# Patient Record
Sex: MALE | Race: WHITE | NOT HISPANIC OR LATINO | Employment: OTHER
[De-identification: names, ages, dates, MRNs, and addresses within clinical notes are randomized per-mention and may not be internally consistent; named-entity substitution may affect disease eponyms.]

---

## 2024-02-27 ENCOUNTER — TRANSCRIBE ORDERS (OUTPATIENT)
Dept: OTHER | Age: 59
End: 2024-02-27

## 2024-02-27 ENCOUNTER — TELEPHONE (OUTPATIENT)
Dept: NEUROLOGY | Facility: CLINIC | Age: 59
End: 2024-02-27

## 2024-02-27 DIAGNOSIS — R13.10 DYSPHAGIA, UNSPECIFIED: ICD-10-CM

## 2024-02-27 DIAGNOSIS — R47.1 DYSARTHRIA: ICD-10-CM

## 2024-02-27 DIAGNOSIS — R27.0 ATAXIA: Primary | ICD-10-CM

## 2024-02-27 DIAGNOSIS — R29.6 REPEATED FALLS: ICD-10-CM

## 2024-02-27 NOTE — TELEPHONE ENCOUNTER
Per note from PCP:  After reviewing last Neuro note with Dr. Dominguez, he recommends patient to see a Neuro Muscular Specialist at [facility] or [place]. They are more specialized with his symptoms. Will scan all notes into Epic.     Chart review shows pt has hx of ataxia and tremor.     Per referral comment:  Reason for Referral - Consult (Routine) - Authorized  Comments   [facility]   Dr Jarad Campbell or Dr Miky Escalona       Will route to [facility] to assist with scheduling with movement disorders.     Natalie PALMER RN, BSN  [facility] Neurology

## 2024-02-27 NOTE — TELEPHONE ENCOUNTER
Health Call Center    Phone Message    May a detailed message be left on voicemail: yes     Reason for Call: Appointment Intake    Referring Provider Name: Jeannette Childers MD  Diagnosis and/or Symptoms: Ataxia [R27.0]  Repeated falls [R29.6]  Dysphagia, unspecified [R13.10]  Dysarthria [R47.1]    Please review and assist with scheduling, patient has been referred to Movement clinic, diagnosis conflicting with protocol.    Action Taken: Other: Neurology    Travel Screening: Not Applicable

## 2024-03-01 NOTE — TELEPHONE ENCOUNTER
Called pt and arranged for him to be seen in the ataxia clinic with Dr. Dave Haro and Casey Smith, genetic counselor on March 29 at 10:30am. He is referred by Dr. Jeannette Childers from [facility]. He said they recently moved back to [place] from [place]. He has been seen by Dr. Phill South at [facility] in [place] when he has had CT's and MRI's. He said he always had problems with his legs but a while ago he had a fusion of his spine in his neck and about 1 1/2 years after that surgery he started having legs, balance and weakness. 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 He doesn't sleep well because he has a lot of pain all over his body. He hasn't fallen lately.Sometimes he uses a cane or else hangs on his wife for stability. He chokes at times on food, mostly pasta. He had an MRI in 2016 or 2017 in [place] of his head but had scans of his back in 2021 at the same clinic. He doesn't have problems with bowel or bladder habits.

## 2024-03-19 NOTE — TELEPHONE ENCOUNTER
Action 3/19/24 MV 11.27am   Action Taken 1) Called [facility] to obtain care everywhere ID. Was told to send me request to the email provided in care everywhere or fax request for records. Phone number to imaging department: [phone]  2) Email sent to [email or web address]      Action 3/19/24 MV 1.55pm   Action Taken Received email from [facility] with patient's CE ID - records pulled in CE     Action 3/21/24 MV 11.44am   Action Taken Imaging request faxed to [facility]  Fax # [phone]  Tracking # [id number]     Action 3/25/24 MV 8.49am   Action Taken 2nd request faxed to [facility]    UPDATE: [facility] returned my fax stating they already sent off the disks with my label     Action 3/28/24 MV 1.53pm   Action Taken Imaging disks delivered Wednesday, however not in mail that was delivered yesterday nor today. Went to  and [facility] not there. Went to dock and they said everything was taken by Latoya already, tomas if it was delivered yesterday. IB sent to [facility]     Action 4/11/24 MV 12.09pm   Action Taken Sent new [employer] label:  [id number]       RECORDS RECEIVED FROM: external   REASON FOR VISIT: ataxia   PROVIDER: Dr. Haro   DATE OF APPT: 3/29/24   NOTES (FOR ALL VISITS) STATUS DETAILS   OFFICE NOTE from referring provider Care Everywhere Dr Jeannette Childers @ [facility]:  1/26/24   OFFICE NOTE from other specialist Care Everywhere Dr. Phill South @ [facility] [place]:  1/10/22 telephone encounter  11/26/18  10/29/15  2/7/11  (Additional encounters)    Katja Bragg @ [facility] Genetic Testing:  10/29/15  9/14/15  9/10/15  7/24/15  6/23/15   EMG Care Everywhere [facility]:  7/7/21  7/1/15  4/17/09   MEDICATION LIST Care Everywhere    IMAGING  (FOR ALL VISITS)     MRI (HEAD, NECK, SPINE) In process [facility]:  MRI Thoracic Spine 5/22/21  MRI Lumbar Spine 5/22/21  MRI Cervical Spine 4/3/21  MRI Lumbar Spine 7/11/19  MRI  Cervical Spine 6/1/19  MRI Brain 12/15/18  MRI Cervical Spine 5/20/17  MRI Thoracic Spine 4/29/17  MRI Lumbar Spine 2/25/17  MRI Cervical Spine 6/18/16  MRI Lumbar Spine 8/24/13  MRI Cervical Spine 3/23/13  MRI Cervical Spine 3/27/10  MRI Lumbar Spine 11/21/09  MRI Cervical Spine 1/17/09  MRI Cervical Spine 5/19/07  MRI Cervical Spine 8/2/03

## 2024-03-29 ENCOUNTER — OFFICE VISIT (OUTPATIENT)
Dept: NEUROLOGY | Facility: CLINIC | Age: 59
End: 2024-03-29
Attending: STUDENT IN AN ORGANIZED HEALTH CARE EDUCATION/TRAINING PROGRAM
Payer: COMMERCIAL

## 2024-03-29 ENCOUNTER — PRE VISIT (OUTPATIENT)
Dept: NEUROLOGY | Facility: CLINIC | Age: 59
End: 2024-03-29

## 2024-03-29 VITALS
WEIGHT: 191.3 LBS | HEART RATE: 87 BPM | SYSTOLIC BLOOD PRESSURE: 126 MMHG | DIASTOLIC BLOOD PRESSURE: 88 MMHG | OXYGEN SATURATION: 98 %

## 2024-03-29 DIAGNOSIS — M54.12 CERVICAL RADICULOPATHY: Primary | ICD-10-CM

## 2024-03-29 DIAGNOSIS — R29.6 REPEATED FALLS: ICD-10-CM

## 2024-03-29 DIAGNOSIS — R13.10 DYSPHAGIA, UNSPECIFIED: ICD-10-CM

## 2024-03-29 DIAGNOSIS — R47.1 DYSARTHRIA: ICD-10-CM

## 2024-03-29 DIAGNOSIS — R27.0 ATAXIA: Primary | ICD-10-CM

## 2024-03-29 PROCEDURE — 99207 PR NO CHARGE LOS: CPT | Performed by: GENETIC COUNSELOR, MS

## 2024-03-29 PROCEDURE — 99202 OFFICE O/P NEW SF 15 MIN: CPT | Performed by: PSYCHIATRY & NEUROLOGY

## 2024-03-29 RX ORDER — MELOXICAM 7.5 MG/1
7.5 TABLET ORAL
COMMUNITY
Start: 2024-01-26

## 2024-03-29 RX ORDER — PROPRANOLOL HCL 60 MG
60 CAPSULE, EXTENDED RELEASE 24HR ORAL
COMMUNITY
Start: 2024-01-26

## 2024-03-29 RX ORDER — CYCLOBENZAPRINE HCL 10 MG
10 TABLET ORAL
COMMUNITY
Start: 2024-01-26

## 2024-03-29 RX ORDER — HYDROCHLOROTHIAZIDE 25 MG/1
1 TABLET ORAL EVERY MORNING
COMMUNITY
Start: 2024-01-26

## 2024-03-29 RX ORDER — ATORVASTATIN CALCIUM 40 MG/1
40 TABLET, FILM COATED ORAL DAILY
COMMUNITY
Start: 2024-01-26

## 2024-03-29 ASSESSMENT — PAIN SCALES - GENERAL: PAINLEVEL: MODERATE PAIN (5)

## 2024-03-29 NOTE — LETTER
3/29/2024       RE: Jony Olson  [address]  [place] [address]     Dear Colleague,    Thank you for referring your patient, Jony Olson, to the [facility] NEUROLOGY CLINIC [place] [facility]. Please see a copy of my visit note below.    Mr. Pineda is a 58-year-old man who moved to [place] from [place] about 2 years ago.  He was thoroughly investigated in [place] at [facility] regarding weakness of the upper and lower extremities.  He did have cervical discectomy anterior cervical discectomy and fusion a while ago.  This did apparently help lower extremity weakness but the symptoms recurred.  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  It is unclear why he was referred to the ataxia clinic rather than neuromuscular clinic.  He does have tremor which is responsive to propranolol.  His father also had tremor so this is likely essential tremor and related to the his other neurological condition.  No past medical history on file.  We do not have brain MRI imaging available    Current Outpatient Medications   Medication    atorvastatin (LIPITOR) 40 MG tablet    cyclobenzaprine (FLEXERIL) 10 MG tablet    hydrochlorothiazide (HYDRODIURIL) 25 MG tablet    meloxicam (MOBIC) 7.5 MG tablet    propranolol ER (INDERAL LA) 60 MG 24 hr capsule     No current facility-administered medications for this visit.     Physical examination does have long muscular neck and profound sloping of the shoulders strength was otherwise 5 to 5-/5 in the upper and lower extremities reflexes were symmetrical slightly hyper at the knees.  Plantars were equivocal bilaterally.  Pinprick sensation was unremarkable vibratory sensation was unremarkable he could walk on tiptoes and heels his gait was  wide-based but could perform tandem with a slight difficulty.    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  I will refer him to neuromuscular clinic for further evaluation.  He was seen by our genetic counselor Casey Smith please see Casey's note for more details.      Again, thank you for allowing me to participate in the care of your patient.      Sincerely,    Joni Haro MD

## 2024-03-29 NOTE — PROGRESS NOTES
Mr. Pineda is a 58-year-old man who moved to [place] from [place] about 2 years ago.  He was thoroughly investigated in [place] at [facility] regarding weakness of the upper and lower extremities.  He did have cervical discectomy anterior cervical discectomy and fusion a while ago.  This did apparently help lower extremity weakness but the symptoms recurred.  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  It is unclear why he was referred to the ataxia clinic rather than neuromuscular clinic.  He does have tremor which is responsive to propranolol.  His father also had tremor so this is likely essential tremor and related to the his other neurological condition.  No past medical history on file.  We do not have brain MRI imaging available    Current Outpatient Medications   Medication    atorvastatin (LIPITOR) 40 MG tablet    cyclobenzaprine (FLEXERIL) 10 MG tablet    hydrochlorothiazide (HYDRODIURIL) 25 MG tablet    meloxicam (MOBIC) 7.5 MG tablet    propranolol ER (INDERAL LA) 60 MG 24 hr capsule     No current facility-administered medications for this visit.     Physical examination does have long muscular neck and profound sloping of the shoulders strength was otherwise 5 to 5-/5 in the upper and lower extremities reflexes were symmetrical slightly hyper at the knees.  Plantars were equivocal bilaterally.  Pinprick sensation was unremarkable vibratory sensation was unremarkable he could walk on tiptoes and heels his gait was wide-based but could perform tandem with a slight difficulty.    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  I will refer him to neuromuscular clinic for further evaluation.  He was seen by our genetic counselor  Casey Smith please see Casey's note for more details.

## 2024-03-29 NOTE — LETTER
3/29/2024       RE: Jony Olson  [address]  [place] [address]     Dear Colleague,    Thank you for referring your patient, Jony Olson, to the [facility] NEUROLOGY CLINIC [facility]. Please see a copy of my visit note below.    GENETIC COUNSELING-Neurology  Jony Olson was seen today for genetic consultation and neurologic exam. Jony was accompanied to the appointment by his wife, Isabelle. I spent a total of 20 minutes with the patient with the majority of the time being spent on genetic counseling and testing options. The patient also underwent a neurological examination from Dr. Haro after our visit.      MEDICAL HISTORY  Please see Dr. Haro's notes for a more thorough summary of medical history. Briefly, Jony reports that he developed symptoms of a neuromuscular disease around 2014. He was noted to have scapular winging.  He had genetic testing for FSHD (negative). He also had an LGMD panel that identified two pathogenic variants- but each variant was a single variant in a recessive gene without a second variant.  This is documented in care everywhere and these variants were not believed to be the cause of his symptoms.      He was seen in ataxia clinic due to a note that he had ataxia on a prior exam- but in reviewing old clinic notes, all I can see is a statement that he has unsteady gait WITHOUT features of ataxia.     FAMILY HISTORY-see scanned pedigree  Jony's father was reported to have balance issues with onset in his 70s'.  Jony's mother and maternal grandmother both were diagnosed with dementia.        GENETIC COUNSELING  We discussed the genetic and environmental basis of neurologic disease. I explained that in most cases, it results from complex and lifelong interaction of multiple genetic and environmental factors. In some cases, there may be a single gene cause. I explained that the presence  of additional family members and/or young ages at diagnosis are typically clues for a single-gene cause.  Given that it was not clear that he was actually supposed to be seen in an ataxia clinic, we did not pursue any additional genetic testing at today's visit.        Again, thank you for allowing me to participate in the care of your patient.      Sincerely,    Roshan Smith GC

## 2024-03-29 NOTE — PROGRESS NOTES
GENETIC COUNSELING-Neurology  Jony Olson was seen today for genetic consultation and neurologic exam. Jony was accompanied to the appointment by his wife, Isabelle. I spent a total of 20 minutes with the patient with the majority of the time being spent on genetic counseling and testing options. The patient also underwent a neurological examination from Dr. Haro after our visit.      MEDICAL HISTORY  Please see Dr. Haro's notes for a more thorough summary of medical history. Briefly, Jony reports that he developed symptoms of a neuromuscular disease around 2014. He was noted to have scapular winging.  He had genetic testing for FSHD (negative). He also had an LGMD panel that identified two pathogenic variants- but each variant was a single variant in a recessive gene without a second variant.  This is documented in care everywhere and these variants were not believed to be the cause of his symptoms.      He was seen in ataxia clinic due to a note that he had ataxia on a prior exam- but in reviewing old clinic notes, all I can see is a statement that he has unsteady gait WITHOUT features of ataxia.     FAMILY HISTORY-see scanned pedigree  Jony's father was reported to have balance issues with onset in his 70s'.  Jony's mother and maternal grandmother both were diagnosed with dementia.        GENETIC COUNSELING  We discussed the genetic and environmental basis of neurologic disease. I explained that in most cases, it results from complex and lifelong interaction of multiple genetic and environmental factors. In some cases, there may be a single gene cause. I explained that the presence of additional family members and/or young ages at diagnosis are typically clues for a single-gene cause.  Given that it was not clear that he was actually supposed to be seen in an ataxia clinic, we did not pursue any additional genetic testing at today's visit.      Casey Smith MS, [facility]  Certified Genetic  Counselor  [facility] and Ataxia Clinics

## 2024-08-06 ENCOUNTER — TRANSCRIBE ORDERS (OUTPATIENT)
Dept: OTHER | Age: 59
End: 2024-08-06

## 2024-08-06 DIAGNOSIS — R29.6 REPEATED FALLS: ICD-10-CM

## 2024-08-06 DIAGNOSIS — R47.1 DYSARTHRIA: ICD-10-CM

## 2024-08-06 DIAGNOSIS — R13.10 DYSPHAGIA, UNSPECIFIED TYPE: ICD-10-CM

## 2024-08-06 DIAGNOSIS — R27.0 ATAXIA: Primary | ICD-10-CM

## 2024-10-06 ENCOUNTER — HEALTH MAINTENANCE LETTER (OUTPATIENT)
Age: 59
End: 2024-10-06

## 2024-11-20 NOTE — TELEPHONE ENCOUNTER
RECORDS RECEIVED FROM: external   REASON FOR VISIT:   Ataxia   R29.6 (ICD-10-CM) - Repeated falls   R13.10 (ICD-10-CM) - Dysphagia, unspecified type   R47.1 (ICD-10-CM) - Dysarthria      PROVIDER: Dr. Rose   DATE OF APPT: 1/3/25   NOTES (FOR ALL VISITS) STATUS DETAILS   OFFICE NOTE from referring provider Care Everywhere Dr Jeannette Childers @ [facility]:  7/26/24   OFFICE NOTE from other specialist Internal/CE Dr Haro @ [facility] Neuro:  3/29/24    Dr. Phill South @ [facility] [place]:  1/10/22 telephone encounter  11/26/18  10/29/15  2/7/11  (Additional encounters)     Katja Bragg @ [facility] Genetic Testing:  10/29/15  9/14/15  9/10/15  7/24/15  6/23/15   EMG Care Everywhere [facility]:  7/7/21  7/1/15  4/17/09   MEDICATION LIST Internal    IMAGING  (FOR ALL VISITS)     MRI (HEAD, NECK, SPINE) PACS [facility]:  MRI Thoracic Spine 5/22/21  MRI Lumbar Spine 5/22/21  MRI Cervical Spine 4/3/21  MRI Lumbar Spine 7/11/19  MRI Cervical Spine 6/1/19  MRI Brain 12/15/18  MRI Cervical Spine 5/20/17  MRI Thoracic Spine 4/29/17  MRI Lumbar Spine 2/25/17  MRI Cervical Spine 6/18/16  MRI Lumbar Spine 8/24/13  MRI Cervical Spine 3/23/13  MRI Cervical Spine 3/27/10  MRI Lumbar Spine 11/21/09  MRI Cervical Spine 1/17/09  MRI Cervical Spine 5/19/07  MRI Cervical Spine 8/2/03

## 2025-01-03 ENCOUNTER — ANCILLARY PROCEDURE (OUTPATIENT)
Dept: GENERAL RADIOLOGY | Facility: CLINIC | Age: 60
End: 2025-01-03
Attending: STUDENT IN AN ORGANIZED HEALTH CARE EDUCATION/TRAINING PROGRAM
Payer: COMMERCIAL

## 2025-01-03 ENCOUNTER — PRE VISIT (OUTPATIENT)
Dept: NEUROLOGY | Facility: CLINIC | Age: 60
End: 2025-01-03

## 2025-01-03 ENCOUNTER — LAB (OUTPATIENT)
Dept: LAB | Facility: CLINIC | Age: 60
End: 2025-01-03
Payer: COMMERCIAL

## 2025-01-03 DIAGNOSIS — M47.812 CERVICAL SPONDYLOSIS WITHOUT MYELOPATHY: ICD-10-CM

## 2025-01-03 DIAGNOSIS — R13.10 DYSPHAGIA, UNSPECIFIED TYPE: ICD-10-CM

## 2025-01-03 DIAGNOSIS — M62.58 MUSCLE ATROPHY OF UPPER EXTREMITY: ICD-10-CM

## 2025-01-03 LAB
CK SERPL-CCNC: 231 U/L (ref 39–308)
LACTATE SERPL-SCNC: 0.7 MMOL/L (ref 0.7–2)
Lab: NORMAL
PERFORMING LABORATORY: NORMAL
SPECIMEN STATUS: NORMAL
TEST NAME: NORMAL
TOTAL PROTEIN SERUM FOR ELP: 7.1 G/DL (ref 6.4–8.3)
TSH SERPL DL<=0.005 MIU/L-ACNC: 1.54 UIU/ML (ref 0.3–4.2)
VIT B12 SERPL-MCNC: 636 PG/ML (ref 232–1245)

## 2025-01-03 PROCEDURE — 86334 IMMUNOFIX E-PHORESIS SERUM: CPT | Performed by: STUDENT IN AN ORGANIZED HEALTH CARE EDUCATION/TRAINING PROGRAM

## 2025-01-03 PROCEDURE — 36415 COLL VENOUS BLD VENIPUNCTURE: CPT | Performed by: PATHOLOGY

## 2025-01-03 PROCEDURE — 36416 COLLJ CAPILLARY BLOOD SPEC: CPT | Performed by: PATHOLOGY

## 2025-01-03 PROCEDURE — 84443 ASSAY THYROID STIM HORMONE: CPT | Performed by: PATHOLOGY

## 2025-01-03 PROCEDURE — 86038 ANTINUCLEAR ANTIBODIES: CPT | Performed by: STUDENT IN AN ORGANIZED HEALTH CARE EDUCATION/TRAINING PROGRAM

## 2025-01-03 PROCEDURE — 99000 SPECIMEN HANDLING OFFICE-LAB: CPT | Performed by: PATHOLOGY

## 2025-01-03 PROCEDURE — 73522 X-RAY EXAM HIPS BI 3-4 VIEWS: CPT | Mod: GC | Performed by: RADIOLOGY

## 2025-01-03 PROCEDURE — 84155 ASSAY OF PROTEIN SERUM: CPT | Performed by: PATHOLOGY

## 2025-01-03 PROCEDURE — 82390 ASSAY OF CERULOPLASMIN: CPT | Performed by: STUDENT IN AN ORGANIZED HEALTH CARE EDUCATION/TRAINING PROGRAM

## 2025-01-03 PROCEDURE — 82550 ASSAY OF CK (CPK): CPT | Performed by: PATHOLOGY

## 2025-01-03 PROCEDURE — 82607 VITAMIN B-12: CPT | Performed by: STUDENT IN AN ORGANIZED HEALTH CARE EDUCATION/TRAINING PROGRAM

## 2025-01-03 PROCEDURE — 83605 ASSAY OF LACTIC ACID: CPT | Performed by: PATHOLOGY

## 2025-01-03 PROCEDURE — 84630 ASSAY OF ZINC: CPT | Mod: 90 | Performed by: PATHOLOGY

## 2025-01-03 PROCEDURE — 84165 PROTEIN E-PHORESIS SERUM: CPT | Mod: TC | Performed by: STUDENT IN AN ORGANIZED HEALTH CARE EDUCATION/TRAINING PROGRAM

## 2025-01-03 PROCEDURE — 86334 IMMUNOFIX E-PHORESIS SERUM: CPT | Mod: 26 | Performed by: STUDENT IN AN ORGANIZED HEALTH CARE EDUCATION/TRAINING PROGRAM

## 2025-01-03 PROCEDURE — 83789 MASS SPECTROMETRY QUAL/QUAN: CPT | Mod: 90 | Performed by: PATHOLOGY

## 2025-01-03 PROCEDURE — 84165 PROTEIN E-PHORESIS SERUM: CPT | Mod: 26 | Performed by: STUDENT IN AN ORGANIZED HEALTH CARE EDUCATION/TRAINING PROGRAM

## 2025-01-03 PROCEDURE — 82525 ASSAY OF COPPER: CPT | Mod: 90 | Performed by: PATHOLOGY

## 2025-01-05 LAB
COPPER SERPL-MCNC: 88.1 UG/DL
ZINC SERPL-MCNC: 61.6 UG/DL

## 2025-01-06 LAB
ALBUMIN SERPL ELPH-MCNC: 4.6 G/DL (ref 3.7–5.1)
ALPHA1 GLOB SERPL ELPH-MCNC: 0.3 G/DL (ref 0.2–0.4)
ALPHA2 GLOB SERPL ELPH-MCNC: 0.6 G/DL (ref 0.5–0.9)
ANA SER QL IF: NEGATIVE
B-GLOBULIN SERPL ELPH-MCNC: 0.7 G/DL (ref 0.6–1)
CERULOPLASMIN SERPL-MCNC: 28 MG/DL (ref 20–60)
GAMMA GLOB SERPL ELPH-MCNC: 0.9 G/DL (ref 0.7–1.6)
LOCATION OF TASK: NORMAL
LOCATION OF TASK: NORMAL
M PROTEIN SERPL ELPH-MCNC: 0 G/DL
PROT PATTERN SERPL ELPH-IMP: NORMAL
PROT PATTERN SERPL IFE-IMP: NORMAL

## 2025-01-07 LAB — MAYO MISC RESULT: NORMAL

## 2025-02-27 ENCOUNTER — TELEPHONE (OUTPATIENT)
Dept: NEUROLOGY | Facility: CLINIC | Age: 60
End: 2025-02-27
Payer: COMMERCIAL

## 2025-02-27 NOTE — TELEPHONE ENCOUNTER
Patient Contacted for the patient to call back and schedule the following:    Appointment type: RETURN NEUROLOGY  Provider: VALENTE  Return date: NEXT AVAILABLE  Specialty phone number: [phone]  Additional appointment(s) needed: N/A  Additonal Notes: Patient requested call back in 15-20 minutes for rescheduling

## 2025-03-31 ENCOUNTER — OFFICE VISIT (OUTPATIENT)
Dept: NEUROLOGY | Facility: CLINIC | Age: 60
End: 2025-03-31
Payer: COMMERCIAL

## 2025-03-31 VITALS
HEART RATE: 97 BPM | DIASTOLIC BLOOD PRESSURE: 91 MMHG | OXYGEN SATURATION: 100 % | RESPIRATION RATE: 16 BRPM | SYSTOLIC BLOOD PRESSURE: 123 MMHG

## 2025-03-31 DIAGNOSIS — M47.812 CERVICAL SPONDYLOSIS WITHOUT MYELOPATHY: ICD-10-CM

## 2025-03-31 DIAGNOSIS — M48.062 SPINAL STENOSIS OF LUMBAR REGION WITH NEUROGENIC CLAUDICATION: Primary | ICD-10-CM

## 2025-03-31 PROCEDURE — 3080F DIAST BP >= 90 MM HG: CPT | Performed by: STUDENT IN AN ORGANIZED HEALTH CARE EDUCATION/TRAINING PROGRAM

## 2025-03-31 PROCEDURE — 3074F SYST BP LT 130 MM HG: CPT | Performed by: STUDENT IN AN ORGANIZED HEALTH CARE EDUCATION/TRAINING PROGRAM

## 2025-03-31 PROCEDURE — 99215 OFFICE O/P EST HI 40 MIN: CPT | Performed by: STUDENT IN AN ORGANIZED HEALTH CARE EDUCATION/TRAINING PROGRAM

## 2025-03-31 NOTE — NURSING NOTE
Chief Complaint   Patient presents with    RECHECK     Here for a follow up, confirmed with patient     Linda Fuentes

## 2025-03-31 NOTE — PROGRESS NOTES
CHIEF COMPLAINT / REASON FOR VISIT  Mr. Olson returned to discuss the results of the tests and evaluations.     His symptoms have not changed since last visit.  His main complaint continued to be a sensation of leg fatigue, soreness after prolonged standing or walking.  He can only walk for half a mile before having to sit down due to soreness in both thighs.  There is no new or worsening weakness in upper or lower limbs.    He has not had an EMG or physical therapy or swallowing study as he was not sure if the procedure and services are covered by his insurance.    - MRI L spine 12/27/2024:   L1-2: Disc degeneration.  Small posterior disc bulge.  No central canal or   neural foraminal narrowing.  L2-3: Disc degeneration.  Small posterior disc bulge.  Mild central canal   narrowing.  Mild facet hypertrophy.  Mild bilateral neural foraminal narrowing.   L3-4: Disc degeneration.  Minimal grade 1 retrolisthesis.  Small posterior disc   bulge.  Mild central canal narrowing.  Bilateral facet hypertrophy.   Mild-to-moderate left and mild-to-moderate right neural foraminal narrowing.   L4-5: Mild grade 1 retrolisthesis.  Disc degeneration.  Posterior disc bulge.   Moderate right lateral recess narrowing.  Disc bulge may contact the traversing   right L5 nerve roots image 41 series 21.  Mild-to-moderate central canal   narrowing.  Bilateral facet hypertrophy.  Moderately advanced right neural   foraminal narrowing may contact the exiting right L4 nerve root.   L5-S1: Disc degeneration.  Small posterior disc bulge.  Mild facet hypertrophy.   No central canal narrowing.  Mild-to-moderate right neural foraminal narrowing.     -Pelvis and hip x-ray showed mild degenerative change of the hips without substantial joint space loss    - CK, lactic acid, Pompe blood spot, vitamin B12, SPEP/VIOLET, copper, zn, ceruloplasmin were unremarkable     The following portions of the patient's history were reviewed and updated as appropriate:  allergies, current medications, family history, medical history, surgical history, social history, and problem list.       PHYSICAL EXAM    NEUROLOGICAL EXAMINATION  Mental status: normal.  Gait: wide-based. Slightly increased hip circumduction both sides. Able to tandem without difficulties  Walk on heels: yes, bilaterally.  Walk on toes: yes, bilaterally.    Arise from squatting position: yes.  Getting up from seated position without pushing on chair: yes.  Posture: scoliosis of thoracolumbar region  Coordination: normal.  Speech: normal.  Extrapyramidal: normal.    Cranial nerves   R Muscle strength L  R  L   5 Frontalis 5       5 Orbicularis oculi 5  3 mm Pupils 3 mm   5 Lower facial muscles 5  nl Light reflex nl   5 Temporal-Masseter 5  no Ptosis no   5 Palate-Pharynx 5  nl Extraocular muscles nl   5 Sternocleidomastoid 5       5 Trapezius 5  nl Hearing nl   5 Genioglossus 5       Muscle atrophy/ enlargement: no  Fasciculations: no tongue atrophy         R Muscle, strength L  R Muscle, strength L    Neck     Trunk    5 Neck flexors 5  * Abdominal muscles    5 Neck extensors 5   Paraspinals     Upper Limbs    Lower Limbs    5 Supraspinatus 5  5 Iliopsoas 5   5 Infraspinatus 5  5 Adductors, thigh 5   5 Pectoralis 5  5^ Gluteus medius 5^   5 Deltoid 5  5^ Gluteus max 5^   5 Biceps brachii 5  5 Quadriceps 5   5 Brachioradialis 5  5 Hamstrings 5    Supinator/pronator   5 Tibialis anterior 5   5 Triceps 5  5 Peronei 5   5 Wrist extensor 5  5 EHL 5   5 Wrist flexors 5  5 Toe extensors 5   5 Digit extensors 5  5 Tibialis post. 5   5 Digit flexors 5  5 Toe flexors 5   5 Thenar 5  5 Calf muscles 5   5 Hypothenar 5       5 Interossei 5       *unable to sit-up without using arms from supine. No Beevor's sign  ^Tested while lying down  Muscle atrophy / enlargement: Atrophy of Right>left biceps, deltoids,  and triceps  Fasciculations: no  Clinical myotonia: no  Rippling: no     R Reflexes L   1 Biceps brachii 1   1  Brachioradialis 1   1 Triceps 1   no Subramanian no   3 Quadriceps 3   2 Gastroc-soleus 2   no Clonus (ankle) no   flexion Plantar response flexion        High arched palate: Absent. Elongated face: Absent. Scapular winging: Medial winging bilaterally Pes cavus: Absent. Hammertoes: Absent. Contractures: Absent. Joint hypermobility: Absent.    Sensation  Face: normal.  Upper limbs: normal for all modalities.  Lower limbs: normal for all modalities.    ASSESSMENT / PLAN  #1 Suspected neuro claudication from lumbar spinal stenosis  His current complaint of leg soreness/fatigue after prolonged standing and walking is most consistent with neuro claudication.  There continues to be no focal weakness in the legs.  Patellar reflexes were slightly brisk, which could be from history of cervical spine stenosis. MRI lumbar spine from December 2024 showed posterior disc bulge and mild to moderate central canal stenosis.  He has tried exercises that he learned from physical therapy in the past without significant improvement.  I recommend that he get an opinion from spine surgeon to discuss risks and benefits of surgical intervention.  He would like to see a spine surgeon closer to home and he will discuss with his primary care provider for referral.    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 Olson's neurological exam continued to show mild atrophy of bilateral (R>L) biceps, deltoid, and triceps, bilateral medial scapula winging and scoliosis of spine resulting in tilting of shoulder. Despite muscle atrophy, he continues to have preserved normal muscle strength in these muscles. I suspect the loss of muscle bulk is likely  residual finding from chronic multilevel cervical spondylotic radiculopathy predominantly involving right C5-6 and left C6-7 rather than muscle disease. The lack of progressive weakness in the past several years (after C-spine surgery) also make muscular dystrophy less likely. ANO5, DYSF, and GMPPB are associated with autosomal recessive conditions and a single variant in these genes is insufficient to cause disease. We agree on starting with a repeat EMG to evaluate for cervical radiculopathy vs myopathic process.     Recommendations:  - EMG/NCS. Evaluate for cervical radiculopathy vs myopathic process in the upper limbs.  Please also include nerve conduction study and needle EMG of one lower limb to evaluate for lumbosacral radiculopathy.  -Spine surgeon evaluation locally for possible surgical intervention for lumbar spinal stenosis with neuro claudication.   -He will benefit from PT for lumbar spinal stenosis and appropriate strength training in the setting of scapular instability  - Swallowing evaluation given reported dysphagia    A return appointment will be scheduled after all the above tests and evaluations.      I spent a total of 40 minutes on the day of the visit for chart review, face-to-face visit, counseling/coordination of care, and documentation. Please see the note for further information on patient assessment and treatment.     PATIENT EDUCATION  Ready to learn, no apparent learning barriers were identified; learning preferences include listening.  Explained diagnosis and treatment plan; patient expressed understanding of the content.

## 2025-03-31 NOTE — LETTER
3/31/2025       RE: Jony Olson  [address]  [facility] [address]     Dear Colleague,    Thank you for referring your patient, Jony Olson, to the [facility]UR[facility]IN[facility] [place] at [facility]. Please see a copy of my visit note below.    CHIEF COMPLAINT / REASON FOR VISIT  Mr. Olson returned to discuss the results of the tests and evaluations.     His symptoms have not changed since last visit.  His main complaint continued to be a sensation of leg fatigue, soreness after prolonged standing or walking.  He can only walk for half a mile before having to sit down due to soreness in both thighs.  There is no new or worsening weakness in upper or lower limbs.    He has not had an EMG or physical therapy or swallowing study as he was not sure if the procedure and services are covered by his insurance.    - MRI L spine 12/27/2024:   L1-2: Disc degeneration.  Small posterior disc bulge.  No central canal or   neural foraminal narrowing.  L2-3: Disc degeneration.  Small posterior disc bulge.  Mild central canal   narrowing.  Mild facet hypertrophy.  Mild bilateral neural foraminal narrowing.   L3-4: Disc degeneration.  Minimal grade 1 retrolisthesis.  Small posterior disc   bulge.  Mild central canal narrowing.  Bilateral facet hypertrophy.   Mild-to-moderate left and mild-to-moderate right neural foraminal narrowing.   L4-5: Mild grade 1 retrolisthesis.  Disc degeneration.  Posterior disc bulge.   Moderate right lateral recess narrowing.  Disc bulge may contact the traversing   right L5 nerve roots image 41 series 21.  Mild-to-moderate central canal   narrowing.  Bilateral facet hypertrophy.  Moderately advanced right neural   foraminal narrowing may contact the exiting right L4 nerve root.   L5-S1: Disc degeneration.  Small posterior disc bulge.  Mild facet hypertrophy.   No central canal narrowing.  Mild-to-moderate right neural  foraminal narrowing.     -Pelvis and hip x-ray showed mild degenerative change of the hips without substantial joint space loss    - CK, lactic acid, Pompe blood spot, vitamin B12, SPEP/VIOLET, copper, zn, ceruloplasmin were unremarkable     The following portions of the patient's history were reviewed and updated as appropriate: allergies, current medications, family history, medical history, surgical history, social history, and problem list.       PHYSICAL EXAM    NEUROLOGICAL EXAMINATION  Mental status: normal.  Gait: wide-based. Slightly increased hip circumduction both sides. Able to tandem without difficulties  Walk on heels: yes, bilaterally.  Walk on toes: yes, bilaterally.    Arise from squatting position: yes.  Getting up from seated position without pushing on chair: yes.  Posture: scoliosis of thoracolumbar region  Coordination: normal.  Speech: normal.  Extrapyramidal: normal.    Cranial nerves   R Muscle strength L  R  L   5 Frontalis 5       5 Orbicularis oculi 5  3 mm Pupils 3 mm   5 Lower facial muscles 5  nl Light reflex nl   5 Temporal-Masseter 5  no Ptosis no   5 Palate-Pharynx 5  nl Extraocular muscles nl   5 Sternocleidomastoid 5       5 Trapezius 5  nl Hearing nl   5 Genioglossus 5       Muscle atrophy/ enlargement: no  Fasciculations: no tongue atrophy         R Muscle, strength L  R Muscle, strength L    Neck     Trunk    5 Neck flexors 5  * Abdominal muscles    5 Neck extensors 5   Paraspinals     Upper Limbs    Lower Limbs    5 Supraspinatus 5  5 Iliopsoas 5   5 Infraspinatus 5  5 Adductors, thigh 5   5 Pectoralis 5  5^ Gluteus medius 5^   5 Deltoid 5  5^ Gluteus max 5^   5 Biceps brachii 5  5 Quadriceps 5   5 Brachioradialis 5  5 Hamstrings 5    Supinator/pronator   5 Tibialis anterior 5   5 Triceps 5  5 Peronei 5   5 Wrist extensor 5  5 EHL 5   5 Wrist flexors 5  5 Toe extensors 5   5 Digit extensors 5  5 Tibialis post. 5   5 Digit flexors 5  5 Toe flexors 5   5 Thenar 5  5 Calf muscles 5    5 Hypothenar 5       5 Interossei 5       *unable to sit-up without using arms from supine. No Beevor's sign  ^Tested while lying down  Muscle atrophy / enlargement: Atrophy of Right>left biceps, deltoids,  and triceps  Fasciculations: no  Clinical myotonia: no  Rippling: no     R Reflexes L   1 Biceps brachii 1   1 Brachioradialis 1   1 Triceps 1   no Subramanian no   3 Quadriceps 3   2 Gastroc-soleus 2   no Clonus (ankle) no   flexion Plantar response flexion        High arched palate: Absent. Elongated face: Absent. Scapular winging: Medial winging bilaterally Pes cavus: Absent. Hammertoes: Absent. Contractures: Absent. Joint hypermobility: Absent.    Sensation  Face: normal.  Upper limbs: normal for all modalities.  Lower limbs: normal for all modalities.    ASSESSMENT / PLAN  #1 Suspected neuro claudication from lumbar spinal stenosis  His current complaint of leg soreness/fatigue after prolonged standing and walking is most consistent with neuro claudication.  There continues to be no focal weakness in the legs.  Patellar reflexes were slightly brisk, which could be from history of cervical spine stenosis. MRI lumbar spine from December 2024 showed posterior disc bulge and mild to moderate central canal stenosis.  He has tried exercises that he learned from physical therapy in the past without significant improvement.  I recommend that he get an opinion from spine surgeon to discuss risks and benefits of surgical intervention.  He would like to see a spine surgeon closer to home and he will discuss with his primary care provider for referral.    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 Olson's  neurological exam continued to show mild atrophy of bilateral (R>L) biceps, deltoid, and triceps, bilateral medial scapula winging and scoliosis of spine resulting in tilting of shoulder. Despite muscle atrophy, he continues to have preserved normal muscle strength in these muscles. I suspect the loss of muscle bulk is likely residual finding from chronic multilevel cervical spondylotic radiculopathy predominantly involving right C5-6 and left C6-7 rather than muscle disease. The lack of progressive weakness in the past several years (after C-spine surgery) also make muscular dystrophy less likely. ANO5, DYSF, and GMPPB are associated with autosomal recessive conditions and a single variant in these genes is insufficient to cause disease. We agree on starting with a repeat EMG to evaluate for cervical radiculopathy vs myopathic process.     Recommendations:  - EMG/NCS. Evaluate for cervical radiculopathy vs myopathic process in the upper limbs.  Please also include nerve conduction study and needle EMG of one lower limb to evaluate for lumbosacral radiculopathy.  -Spine surgeon evaluation locally for possible surgical intervention for lumbar spinal stenosis with neuro claudication.   -He will benefit from PT for lumbar spinal stenosis and appropriate strength training in the setting of scapular instability  - Swallowing evaluation given reported dysphagia    A return appointment will be scheduled after all the above tests and evaluations.      I spent a total of 40 minutes on the day of the visit for chart review, face-to-face visit, counseling/coordination of care, and documentation. Please see the note for further information on patient assessment and treatment.     PATIENT EDUCATION  Ready to learn, no apparent learning barriers were identified; learning preferences include listening.  Explained diagnosis and treatment plan; patient expressed understanding of the content.      Again, thank you for allowing me to  participate in the care of your patient.      Sincerely,    Mandeep Rose MD

## 2025-04-02 ENCOUNTER — TELEPHONE (OUTPATIENT)
Dept: NEUROLOGY | Facility: CLINIC | Age: 60
End: 2025-04-02
Payer: COMMERCIAL

## 2025-04-02 NOTE — TELEPHONE ENCOUNTER
Patient confirmed scheduled appointment:  Date: 6/16/25  Time: 10:30AM  Visit type: RETURN NEUROLOGY  Provider: VALENTE  Location: [facility]  Testing/imaging: N/A  Additional notes: Will confirm if virtual visit is an option for pt

## 2025-06-02 ENCOUNTER — OFFICE VISIT (OUTPATIENT)
Dept: NEUROLOGY | Facility: CLINIC | Age: 60
End: 2025-06-02
Attending: STUDENT IN AN ORGANIZED HEALTH CARE EDUCATION/TRAINING PROGRAM
Payer: COMMERCIAL

## 2025-06-02 DIAGNOSIS — M47.812 CERVICAL SPONDYLOSIS WITHOUT MYELOPATHY: ICD-10-CM

## 2025-06-02 DIAGNOSIS — M48.062 SPINAL STENOSIS OF LUMBAR REGION WITH NEUROGENIC CLAUDICATION: ICD-10-CM

## 2025-06-02 PROCEDURE — 95913 NRV CNDJ TEST 13/> STUDIES: CPT | Performed by: PSYCHIATRY & NEUROLOGY

## 2025-06-02 PROCEDURE — 95886 MUSC TEST DONE W/N TEST COMP: CPT | Performed by: PSYCHIATRY & NEUROLOGY

## 2025-06-02 PROCEDURE — 95887 MUSC TST DONE W/N TST NONEXT: CPT | Performed by: PSYCHIATRY & NEUROLOGY

## 2025-06-02 NOTE — LETTER
2025       RE: Jony Olson  [address]  [facility] [address]     Dear Colleague,    Thank you for referring your patient, Jony Olson, to the [facility] EMG [facility]IN[facility] [place] at [facility]. Please see a copy of my visit note below.                        [facility]  Electrodiagnostic Laboratory                 Department of Neurology                                                                                                         Test Date:  2025    Patient: Jony Olson : 1965 Physician: Kendall Barrera MD   Sex: Male AGE: 60 year Ref Phys:    ID#: [id number]   Technician: Kristy Behling     History and Examination:  60 year old with leg soreness and fatigue with walking. He also has proximal upper limb weakness and atrophy with a history of cervical spondylosis s/p cervical spine surgery. This study is being performed to investigate for radiculopathy vs focal neuropathy vs myopathy.     Techniques:  Motor and sensory conduction studies were done with surface recording electrodes. EMG was done with a concentric needle electrode.     Results  Nerve conduction studies:  1. Right median-D2, right ulnar-D5, right radial, right sural, and bilateral superficial peroneal sensory responses are normal.   2. Right median-ulnar palmar interlatency difference is normal.   3. Right peroneal-EDB motor response is absent.   4. Right median-APB, right ulnar-ADM, left peroneal-EDB, right peroneal-TA, and right tibial-AH motor responses are normal.     Needle EM. Fibrillation potentials and positive sharp waves were seen in the right biceps and triceps muscles.   2. Large amplitude and/or long duration motor unit potentials (MUP) were seen in the right deltoid, biceps, PT and triceps muscles.   3. Rapidly firing MUPs with reduced recruitment were seen in the right biceps, PT, and triceps muscles.      Interpretation:  This is an abnormal study. There is electrophysiologic evidence of (1) a chronic right-sided cervical radiculopathy affecting the C6 and C7 nerve roots. Note is also made of (2) an absent right peroneal to EDB motor response. This finding is of uncertain localization or clinical significance. The presence of a normal superficial peroneal sensory response, normal peroneal to TA motor response and normal EMG of peroneal innervated muscles suggests that the right peroneal nerve injury occurred distally at or near the ankle. There is no evidence of a generalized myopathy or motor neuronopathy on the basis of this study. Clinical correlation is recommended.     Kendall Barrera MD  Department of Neurology        Nerve Conduction Studies  Motor Sites      Latency Neg. Amp Neg. Amp Diff Segment Distance Velocity Neg. Dur Neg Area Diff Temperature Comment   Site (ms) Norm (mV) Norm (%)  cm m/s Norm (ms) (%) ( C)    Right Dp Branch Fibular (TA) Motor   Fibular Head 3.5  < 6.0 4.1 -      10.0  33    Pop Fossa 5.5  < 5.7 4.5 - 10 Pop Fossa-Fibular Head 11 55 - 9.7 8 33    Left Fibular (EDB) Motor   Ankle 4.6  < 6.0 3.8 -  Ankle-EDB 8   6.4  24.4    Right Fibular (EDB) Motor   Ankle 2.2  < 6.0 - -  Ankle-EDB 8   -  33    Bel Fibular Head - - - - - Bel Fibular Head-Ankle - -  > 38 - - 24    Right Median (APB) Motor   Wrist 3.5  < 4.4 8.3  > 5.0  Wrist-APB 8   6.8  32.2    Elbow 7.8 - 7.6  > 5.0 -8 Elbow-Wrist 22 51  > 48 6.8 -7 32.2    Right Tibial (AHB) Motor   Ankle 3.8  < 6.5 9.1  > 5.0  Ankle-AH 8   5.1  33.2    Knee 13.8 - 5.2 - -43 Knee-Ankle 39 39  > 38 6.0 -41 33.2    Right Ulnar (ADM) Motor   Wrist 2.6  < 3.5 10.3  > 5.0  Wrist-ADM 8   5.2  32.2    Below Elbow 6.2 - 9.2 - -11 Below Elbow-Wrist 22 61  > 48 5.3 -6 32.2    Above Elbow 7.8 - 9.0 - -2 Above Elbow-Below Elbow 9 56  > 48 5.4 -1 32.2      Sensory Sites      Onset Lat Peak Lat Amp (O-P) Amp (P-P) Segment Distance Velocity Temperature Comment    Site ms (ms)  V Norm ( V)  cm m/s Norm ( C)    Right Median Sensory   Wrist-Dig II 2.1 3.2 31  > 10 44 Wrist-Dig II 10 48  > 48 32.2    Right Median-Ulnar Palmar Sensory        Median   Palm-Wrist 1.28 1.85 26 - 21 Palm-Wrist 8 63 - 32.2         Ulnar   Palm-Wrist 1.30 1.85 18 - 18 Palm-Wrist 8 62 - 32.2    Right Radial Sensory   Forearm-Wrist 1.90 2.5 29  > 15 34 Forearm-Wrist 12 63 - 32    Left Superficial Fibular Sensory   Lower Leg-Ankle 3.2 4.2 10  > 3 12 Lower Leg-Ankle 12.5 39 -     Right Superficial Fibular Sensory   Lower Leg-Ankle 2.9 3.8 8  > 3 13 Lower Leg-Ankle 14 48 - 32.8    Right Sural Sensory   Calf-Lat Malleolus 3.2 3.9 8  > 5 12 Calf-Lat Malleolus 12.5 39  > 38 32    Right Ulnar Sensory   Wrist-Dig V 1.95 2.8 24  > 8 27 Wrist-Dig V 12.5 64  > 48 32.2      Inter-Nerve Comparisons     Nerve 1 Value 1 Nerve 2 Value 2 Parameter Result Normal   Sensory Sites   R Median Palm-Wrist 1.85 ms R Ulnar Palm-Wrist 1.85 ms Peak Lat Diff 0 ms <0.40       Electromyography     Side Muscle Ins Act Fibs/PSW Fasc HF Amp Dur Poly Recrt Int Pat   Right Vastus lat Nml None Nml 0 Nml Nml 0 Nml Nml   Right Tib ant Nml None Nml 0 Nml Nml 0 Nml Nml   Right Gastroc Nml None Nml 0 Nml Nml 0 Nml Nml   Right Fib longus Nml None Nml 0 Nml Nml 0 Nml Nml   Right Gluteus med Nml None Nml 0 Nml Nml 0 Nml Nml   Right Thoracic PSP Nml None Nml 0        Right Deltoid Nml None Nml 0 1+ Nml 1+ Nml Nml   Right Biceps Incr 1+ Nml 0 2+ Nml 0 ModRed Nml   Right Triceps Incr 1+ Nml 0 2+ Nml 1+ SevRed Nml   Right Pronator Teres Nml None Nml 0 1+ Nml 1+ Kirk Nml   Right FDI Nml None Nml 0 Nml Nml 0 Nml Nml     Waveforms / Images:       Motor                    Sensory                                      Again, thank you for allowing me to participate in the care of your patient.      Sincerely,    Kendall Barrera MD

## 2025-06-02 NOTE — PROGRESS NOTES
[facility]  Electrodiagnostic Laboratory                 Department of Neurology                                                                                                         Test Date:  2025    Patient: Jony Olson : 1965 Physician: Kendall Barrera MD   Sex: Male AGE: 60 year Ref Phys:    ID#: [id number]   Technician: Kristy Behling     History and Examination:  60 year old with leg soreness and fatigue with walking. He also has proximal upper limb weakness and atrophy with a history of cervical spondylosis s/p cervical spine surgery. This study is being performed to investigate for radiculopathy vs focal neuropathy vs myopathy.     Techniques:  Motor and sensory conduction studies were done with surface recording electrodes. EMG was done with a concentric needle electrode.     Results  Nerve conduction studies:  1. Right median-D2, right ulnar-D5, right radial, right sural, and bilateral superficial peroneal sensory responses are normal.   2. Right median-ulnar palmar interlatency difference is normal.   3. Right peroneal-EDB motor response is absent.   4. Right median-APB, right ulnar-ADM, left peroneal-EDB, right peroneal-TA, and right tibial-AH motor responses are normal.     Needle EM. Fibrillation potentials and positive sharp waves were seen in the right biceps and triceps muscles.   2. Large amplitude and/or long duration motor unit potentials (MUP) were seen in the right deltoid, biceps, PT and triceps muscles.   3. Rapidly firing MUPs with reduced recruitment were seen in the right biceps, PT, and triceps muscles.     Interpretation:  This is an abnormal study. There is electrophysiologic evidence of (1) a chronic right-sided cervical radiculopathy affecting the C6 and C7 nerve roots. Note is also made of (2) an absent right peroneal to EDB motor response. This finding is of uncertain localization or clinical significance. The presence of a  normal superficial peroneal sensory response, normal peroneal to TA motor response and normal EMG of peroneal innervated muscles suggests that the right peroneal nerve injury occurred distally at or near the ankle. There is no evidence of a generalized myopathy or motor neuronopathy on the basis of this study. Clinical correlation is recommended.     Kendall Barrera MD  Department of Neurology        Nerve Conduction Studies  Motor Sites      Latency Neg. Amp Neg. Amp Diff Segment Distance Velocity Neg. Dur Neg Area Diff Temperature Comment   Site (ms) Norm (mV) Norm (%)  cm m/s Norm (ms) (%) ( C)    Right Dp Branch Fibular (TA) Motor   Fibular Head 3.5  < 6.0 4.1 -      10.0  33    Pop Fossa 5.5  < 5.7 4.5 - 10 Pop Fossa-Fibular Head 11 55 - 9.7 8 33    Left Fibular (EDB) Motor   Ankle 4.6  < 6.0 3.8 -  Ankle-EDB 8   6.4  24.4    Right Fibular (EDB) Motor   Ankle 2.2  < 6.0 - -  Ankle-EDB 8   -  33    Bel Fibular Head - - - - - Bel Fibular Head-Ankle - -  > 38 - - 24    Right Median (APB) Motor   Wrist 3.5  < 4.4 8.3  > 5.0  Wrist-APB 8   6.8  32.2    Elbow 7.8 - 7.6  > 5.0 -8 Elbow-Wrist 22 51  > 48 6.8 -7 32.2    Right Tibial (AHB) Motor   Ankle 3.8  < 6.5 9.1  > 5.0  Ankle-AH 8   5.1  33.2    Knee 13.8 - 5.2 - -43 Knee-Ankle 39 39  > 38 6.0 -41 33.2    Right Ulnar (ADM) Motor   Wrist 2.6  < 3.5 10.3  > 5.0  Wrist-ADM 8   5.2  32.2    Below Elbow 6.2 - 9.2 - -11 Below Elbow-Wrist 22 61  > 48 5.3 -6 32.2    Above Elbow 7.8 - 9.0 - -2 Above Elbow-Below Elbow 9 56  > 48 5.4 -1 32.2      Sensory Sites      Onset Lat Peak Lat Amp (O-P) Amp (P-P) Segment Distance Velocity Temperature Comment   Site ms (ms)  V Norm ( V)  cm m/s Norm ( C)    Right Median Sensory   Wrist-Dig II 2.1 3.2 31  > 10 44 Wrist-Dig II 10 48  > 48 32.2    Right Median-Ulnar Palmar Sensory        Median   Palm-Wrist 1.28 1.85 26 - 21 Palm-Wrist 8 63 - 32.2         Ulnar   Palm-Wrist 1.30 1.85 18 - 18 Palm-Wrist 8 62 - 32.2    Right Radial Sensory    Forearm-Wrist 1.90 2.5 29  > 15 34 Forearm-Wrist 12 63 - 32    Left Superficial Fibular Sensory   Lower Leg-Ankle 3.2 4.2 10  > 3 12 Lower Leg-Ankle 12.5 39 -     Right Superficial Fibular Sensory   Lower Leg-Ankle 2.9 3.8 8  > 3 13 Lower Leg-Ankle 14 48 - 32.8    Right Sural Sensory   Calf-Lat Malleolus 3.2 3.9 8  > 5 12 Calf-Lat Malleolus 12.5 39  > 38 32    Right Ulnar Sensory   Wrist-Dig V 1.95 2.8 24  > 8 27 Wrist-Dig V 12.5 64  > 48 32.2      Inter-Nerve Comparisons     Nerve 1 Value 1 Nerve 2 Value 2 Parameter Result Normal   Sensory Sites   R Median Palm-Wrist 1.85 ms R Ulnar Palm-Wrist 1.85 ms Peak Lat Diff 0 ms <0.40       Electromyography     Side Muscle Ins Act Fibs/PSW Fasc HF Amp Dur Poly Recrt Int Pat   Right Vastus lat Nml None Nml 0 Nml Nml 0 Nml Nml   Right Tib ant Nml None Nml 0 Nml Nml 0 Nml Nml   Right Gastroc Nml None Nml 0 Nml Nml 0 Nml Nml   Right Fib longus Nml None Nml 0 Nml Nml 0 Nml Nml   Right Gluteus med Nml None Nml 0 Nml Nml 0 Nml Nml   Right Thoracic PSP Nml None Nml 0        Right Deltoid Nml None Nml 0 1+ Nml 1+ Nml Nml   Right Biceps Incr 1+ Nml 0 2+ Nml 0 ModRed Nml   Right Triceps Incr 1+ Nml 0 2+ Nml 1+ SevRed Nml   Right Pronator Teres Nml None Nml 0 1+ Nml 1+ Kirk Nml   Right FDI Nml None Nml 0 Nml Nml 0 Nml Nml     Waveforms / Images:       Motor                    Sensory

## 2025-06-16 ENCOUNTER — VIRTUAL VISIT (OUTPATIENT)
Dept: NEUROLOGY | Facility: CLINIC | Age: 60
End: 2025-06-16
Payer: COMMERCIAL

## 2025-06-16 VITALS — BODY MASS INDEX: 26.68 KG/M2 | WEIGHT: 197 LBS | HEIGHT: 72 IN

## 2025-06-16 DIAGNOSIS — M48.062 SPINAL STENOSIS OF LUMBAR REGION WITH NEUROGENIC CLAUDICATION: ICD-10-CM

## 2025-06-16 DIAGNOSIS — M47.812 CERVICAL SPONDYLOSIS WITHOUT MYELOPATHY: Primary | ICD-10-CM

## 2025-06-16 DIAGNOSIS — M62.58 MUSCLE ATROPHY OF UPPER EXTREMITY: ICD-10-CM

## 2025-06-16 ASSESSMENT — PAIN SCALES - GENERAL: PAINLEVEL_OUTOF10: SEVERE PAIN (7)

## 2025-06-16 NOTE — PROGRESS NOTES
"Virtual Visit Details    Type of service:  Video Visit   Video Start Time: {video visit start/end time for provider to select:205397}  Video End Time:{video visit start/end time for provider to select:726561}    Originating Location (pt. Location): {video visit patient location:177390::\"Home\"}  {PROVIDER LOCATION On-site should be selected for visits conducted from your clinic location or adjoining [facility] hospital, academic office, or other nearby [facility] building. Off-site should be selected for all other provider locations, including home:534964}  Distant Location (provider location):  {virtual location provider:199788}  Platform used for Video Visit: {Virtual Visit Platforms:226132::\"Nonpareil\"}    "

## 2025-06-16 NOTE — NURSING NOTE
Current patient location: [address] [place] [address]   [facility] [address]    Is the patient currently in the state of [place]? YES    Visit mode: VIDEO    If the visit is dropped, the patient can be reconnected by:VIDEO VISIT: Text to cell phone:   Telephone Information:   Mobile [phone]       Will anyone else be joining the visit? NO  (If patient encounters technical issues they should call [phone])    Are changes needed to the allergy or medication list? Pt stated no changes to allergies and Pt stated no med changes    Are refills needed on medications prescribed by this physician? NO    Rooming Documentation:  Not applicable    Reason for visit: GARDENIA Santana VVF

## 2025-06-16 NOTE — LETTER
6/16/2025       RE: Jony Olson  [address]  [facility] [address]     Dear Colleague,    Thank you for referring your patient, Jony Olson, to the [facility] [place] at [facility]. Please see a copy of my visit note below.    CHIEF COMPLAINT / REASON FOR VISIT  Mr. Olson returned to discuss the results of the tests and evaluations.     Consult conducted via real-time audio/video technology by Mandeep Rose M.D. in [facility] to the patient at home.    EMG/NCS 6/2/2025 (Dr. Barrera): There is electrophysiologic evidence of (1) a chronic right-sided cervical radiculopathy affecting the C6 and C7 nerve roots. Note is also made of (2) an absent right peroneal to EDB motor response. This finding is of uncertain localization or clinical significance. The presence of a normal superficial peroneal sensory response, normal peroneal to TA motor response and normal EMG of peroneal innervated muscles suggests that the right peroneal nerve injury occurred distally at or near the ankle. There is no evidence of a generalized myopathy or motor neuronopathy on the basis of this study. Clinical correlation is recommended.     He has discussed his neurogenic claudication symptoms with local spine surgeon. Surgical intervention is not recommended at this time. PT and spinal injections were offered but patient did these in the past and chose to exercise and stretch by himself at home.     The following portions of the patient's history were reviewed and updated as appropriate: allergies, current medications, family history, medical history, surgical history, social history, and problem list.       ASSESSMENT / PLAN   #1 Suspected neurogenic claudication from lumbar spinal stenosis (worse at L4-5)  We discussed the results of EMG, which did not show active lumbosacral radiculopathy. The absent right  peroneal EDB could be due to local factor to the muscle in the foot itself and is of uncertain clinical significance. I still feel that his current complaint of leg soreness/fatigue after prolonged standing and walking is most consistent with neuro claudication in the setting of lumbar central canal stenosis.  He has discussed with local spine surgeon and surgical  intervention is not recommended at this time. I recommend continuing conservative measurement of PT and exercise. He will continue to monitor his symptoms. If he were to develop worsening back pain, radicular pain or leg weakness, I recommend reaching out to spine surgeon as he will need reevaluation including repeat MRI L-spine.      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 There is no evidence of myopathy. The single variant in ANO5, GMPPB, and DYSF are unrelated to his clinical presentation. Clinical and exam have been stable for many years. No need for further investigation in this regard.     I spent a total of 30 minutes on the day of the visit for chart review, video visit, counseling/coordination of care, and documentation. Please see the note for further information on patient assessment and treatment.     PATIENT EDUCATION  Ready to learn, no apparent learning barriers were identified; learning preferences include listening.  Explained diagnosis and treatment plan; patient expressed understanding of the content.      Again, thank you for allowing me to participate in the care of your patient.       Sincerely,    Mandeep Rose MD

## 2025-06-16 NOTE — PROGRESS NOTES
CHIEF COMPLAINT / REASON FOR VISIT  Mr. Olson returned to discuss the results of the tests and evaluations.     Consult conducted via real-time audio/video technology by Mnadeep Rose M.D. in [facility] to the patient at home.    EMG/NCS 6/2/2025 (Dr. Barrera): There is electrophysiologic evidence of (1) a chronic right-sided cervical radiculopathy affecting the C6 and C7 nerve roots. Note is also made of (2) an absent right peroneal to EDB motor response. This finding is of uncertain localization or clinical significance. The presence of a normal superficial peroneal sensory response, normal peroneal to TA motor response and normal EMG of peroneal innervated muscles suggests that the right peroneal nerve injury occurred distally at or near the ankle. There is no evidence of a generalized myopathy or motor neuronopathy on the basis of this study. Clinical correlation is recommended.     He has discussed his neurogenic claudication symptoms with local spine surgeon. Surgical intervention is not recommended at this time. PT and spinal injections were offered but patient did these in the past and chose to exercise and stretch by himself at home.     The following portions of the patient's history were reviewed and updated as appropriate: allergies, current medications, family history, medical history, surgical history, social history, and problem list.       ASSESSMENT / PLAN   #1 Suspected neurogenic claudication from lumbar spinal stenosis (worse at L4-5)  We discussed the results of EMG, which did not show active lumbosacral radiculopathy. The absent right peroneal EDB could be due to local factor to the muscle in the foot itself and is of uncertain clinical significance. I still feel that his current complaint of leg soreness/fatigue after prolonged standing and walking is most consistent with neuro claudication in the setting of lumbar central canal stenosis.  He has  discussed with local spine surgeon and surgical  intervention is not recommended at this time. I recommend continuing conservative measurement of PT and exercise. He will continue to monitor his symptoms. If he were to develop worsening back pain, radicular pain or leg weakness, I recommend reaching out to spine surgeon as he will need reevaluation including repeat MRI L-spine.      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 There is no evidence of myopathy. The single variant in ANO5, GMPPB, and DYSF are unrelated to his clinical presentation. Clinical and exam have been stable for many years. No need for further investigation in this regard.     I spent a total of 30 minutes on the day of the visit for chart review, video visit, counseling/coordination of care, and documentation. Please see the note for further information on patient assessment and treatment.     PATIENT EDUCATION  Ready to learn, no apparent learning barriers were identified; learning preferences include listening.  Explained diagnosis and treatment plan; patient expressed understanding of the content.

## 2025-08-31 ENCOUNTER — HEALTH MAINTENANCE LETTER (OUTPATIENT)
Age: 60
End: 2025-08-31